# Patient Record
Sex: MALE | Race: ASIAN | NOT HISPANIC OR LATINO
[De-identification: names, ages, dates, MRNs, and addresses within clinical notes are randomized per-mention and may not be internally consistent; named-entity substitution may affect disease eponyms.]

---

## 2021-11-09 PROBLEM — Z00.00 ENCOUNTER FOR PREVENTIVE HEALTH EXAMINATION: Status: ACTIVE | Noted: 2021-11-09

## 2021-11-30 ENCOUNTER — APPOINTMENT (OUTPATIENT)
Dept: ORTHOPEDIC SURGERY | Facility: CLINIC | Age: 21
End: 2021-11-30

## 2021-12-06 ENCOUNTER — NON-APPOINTMENT (OUTPATIENT)
Age: 21
End: 2021-12-06

## 2021-12-06 ENCOUNTER — APPOINTMENT (OUTPATIENT)
Dept: ORTHOPEDIC SURGERY | Facility: CLINIC | Age: 21
End: 2021-12-06
Payer: COMMERCIAL

## 2021-12-06 VITALS
DIASTOLIC BLOOD PRESSURE: 98 MMHG | HEART RATE: 72 BPM | HEIGHT: 72 IN | SYSTOLIC BLOOD PRESSURE: 141 MMHG | WEIGHT: 190 LBS | BODY MASS INDEX: 25.73 KG/M2

## 2021-12-06 PROCEDURE — 73562 X-RAY EXAM OF KNEE 3: CPT | Mod: LT

## 2021-12-06 PROCEDURE — 99203 OFFICE O/P NEW LOW 30 MIN: CPT

## 2021-12-06 NOTE — HISTORY OF PRESENT ILLNESS
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 He was playing basketball jumped and landed awkwardly felt a pop in the knee. He was seen at [facility] were negative for fx. He was referred for MRI which showed ACL tear and was recommended sx. He is here for second opinion. The pain is intermittent brought on with prolonged sitting and standing. Takes Advil for pain. Reports buckling/instability. Denies locking, catching, numbness or tingling.  MRI done at . \par \par The patient's past medical history, past surgical history, medications and allergies were reviewed by me today with the patient and documented accordingly. In addition, the patient's family and social history, which were noncontributory to this visit, were reviewed also.

## 2021-12-06 NOTE — DISCUSSION/SUMMARY
[de-identified] : 22 y/o male with left knee ACL tear. \par \par Patient presents for evaluation of acute left knee pain. The patient's symptoms are consistent with anterior cruciate ligament pathology with positive gross instability testing. Patient reports mechanical symptoms of buckling as well as inability to return to full activity. MRI shows full-thickness midsubstance tear of the anterior cruciate ligament. Given the age and activity level of the patient, surgical intervention is warranted. \par \par All risks, benefits and alternatives to left anterior cruciate ligament reconstruction with evaluation of the meniscal and chondral surfaces were discussed in great detail with the patient. Risks include but are not limited to pain, bleeding, infection, stiffness/instability, impingement, graft re-rupture, meniscectomy versus repair, medical complications and risks of anesthesia. In addition, a discussion was had with the patient regarding anterior cruciate ligament graft options. This included the role of autograft versus allograft, and the associated donor site morbidity and rerupture rate with autograft versus risk of disease transmission/rerupture with allograft use. The patient expressed understanding and all questions were answered.  \par \par A discussion was also had with the patient regarding additional precautions during surgery given the recent Covid-19 pandemic; specifically with regards to perioperative care, visitor policy, and pre-admission testing. The patient understands that current protocol requires either documented Covid-19 vaccination or a negative Covid-19 test no more than 48hrs prior to surgery. \par \par Patient questions and concerns were answered. He has elected to proceed and will schedule accordingly.

## 2021-12-06 NOTE — PHYSICAL EXAM
[de-identified] : Oriented to time, place, person\par Mood: Normal\par Affect: Normal\par Appearance: Healthy, well appearing, no acute distress.\par Gait: Normal\par Assistive Devices: None\par \par Left Knee Exam:\par \par Skin: Clean, dry, intact\par Inspection: No obvious malalignment, no masses, no swelling, no effusion\par Pulses: 2+ DP/PT pulses \par ROM: 0-135 degrees of flexion. No pain with deep knee flexion/extension.\par Tenderness: No MJLT. No LJLT. No pain over the patella facets. No pain to the quadriceps tendon. No pain to the patella tendon. No posterior knee tenderness.\par Stability: Stable to varus, valgus. IIB Lachman testing. Negative anterior drawer, negative posterior drawer.\par Strength: 5/5 Q/H/TA/GS/EHL, without atrophy\par Neuro: Intact to light touch throughout, DTRs normal\par Additional Tests: Negative John's test, Negative patellar grind test  [de-identified] : The following radiographs were ordered and read by me during this patients visit. I reviewed each radiograph in detail with the patient and discussed the findings as highlighted below. \par \par 4 views of the left knee were obtained today, 12/06/2021, that show no acute fracture or dislocation. There is no medial, no lateral and no patellofemoral degenerative changes seen. There is no significant malalignment. No significant other obvious osseous abnormality, otherwise unremarkable. \par \par MRI left knee dated 10.20.2021 shows full-thickness proximal ACL with likely mild residual posterior lateral tibial plateau contusion and small effusion.

## 2021-12-06 NOTE — ADDENDUM
[FreeTextEntry1] : This note was written by Olena Fernando on 12/06/2021 acting solely as a scribe for Dr. Jaime Medley.\par \par All medical record entries made by the Scribe were at my, Dr. Jaime Medley, direction and personally dictated by me on 12/06/2021. I have personally reviewed the chart and agree that the record accurately reflects my personal performance of the history, physical exam, assessment and plan.

## 2022-01-07 ENCOUNTER — OUTPATIENT (OUTPATIENT)
Dept: OUTPATIENT SERVICES | Facility: HOSPITAL | Age: 22
LOS: 1 days | End: 2022-01-07
Payer: COMMERCIAL

## 2022-01-07 VITALS
WEIGHT: 184.09 LBS | HEART RATE: 98 BPM | RESPIRATION RATE: 16 BRPM | DIASTOLIC BLOOD PRESSURE: 94 MMHG | TEMPERATURE: 98 F | SYSTOLIC BLOOD PRESSURE: 140 MMHG | HEIGHT: 72 IN | OXYGEN SATURATION: 99 %

## 2022-01-07 DIAGNOSIS — S83.512A SPRAIN OF ANTERIOR CRUCIATE LIGAMENT OF LEFT KNEE, INITIAL ENCOUNTER: ICD-10-CM

## 2022-01-07 DIAGNOSIS — S83.519A SPRAIN OF ANTERIOR CRUCIATE LIGAMENT OF UNSPECIFIED KNEE, INITIAL ENCOUNTER: ICD-10-CM

## 2022-01-07 DIAGNOSIS — I10 ESSENTIAL (PRIMARY) HYPERTENSION: ICD-10-CM

## 2022-01-07 LAB
ANION GAP SERPL CALC-SCNC: 15 MMOL/L — HIGH (ref 7–14)
BUN SERPL-MCNC: 12 MG/DL — SIGNIFICANT CHANGE UP (ref 7–23)
CALCIUM SERPL-MCNC: 9.9 MG/DL — SIGNIFICANT CHANGE UP (ref 8.4–10.5)
CHLORIDE SERPL-SCNC: 102 MMOL/L — SIGNIFICANT CHANGE UP (ref 98–107)
CO2 SERPL-SCNC: 22 MMOL/L — SIGNIFICANT CHANGE UP (ref 22–31)
CREAT SERPL-MCNC: 0.55 MG/DL — SIGNIFICANT CHANGE UP (ref 0.5–1.3)
GLUCOSE SERPL-MCNC: 89 MG/DL — SIGNIFICANT CHANGE UP (ref 70–99)
HCT VFR BLD CALC: 43 % — SIGNIFICANT CHANGE UP (ref 39–50)
HGB BLD-MCNC: 14.8 G/DL — SIGNIFICANT CHANGE UP (ref 13–17)
MCHC RBC-ENTMCNC: 30.5 PG — SIGNIFICANT CHANGE UP (ref 27–34)
MCHC RBC-ENTMCNC: 34.4 GM/DL — SIGNIFICANT CHANGE UP (ref 32–36)
MCV RBC AUTO: 88.5 FL — SIGNIFICANT CHANGE UP (ref 80–100)
NRBC # BLD: 0 /100 WBCS — SIGNIFICANT CHANGE UP
NRBC # FLD: 0 K/UL — SIGNIFICANT CHANGE UP
PLATELET # BLD AUTO: 300 K/UL — SIGNIFICANT CHANGE UP (ref 150–400)
POTASSIUM SERPL-MCNC: 3.9 MMOL/L — SIGNIFICANT CHANGE UP (ref 3.5–5.3)
POTASSIUM SERPL-SCNC: 3.9 MMOL/L — SIGNIFICANT CHANGE UP (ref 3.5–5.3)
RBC # BLD: 4.86 M/UL — SIGNIFICANT CHANGE UP (ref 4.2–5.8)
RBC # FLD: 13.1 % — SIGNIFICANT CHANGE UP (ref 10.3–14.5)
SODIUM SERPL-SCNC: 139 MMOL/L — SIGNIFICANT CHANGE UP (ref 135–145)
WBC # BLD: 7.12 K/UL — SIGNIFICANT CHANGE UP (ref 3.8–10.5)
WBC # FLD AUTO: 7.12 K/UL — SIGNIFICANT CHANGE UP (ref 3.8–10.5)

## 2022-01-07 PROCEDURE — 93010 ELECTROCARDIOGRAM REPORT: CPT

## 2022-01-07 NOTE — H&P PST ADULT - PROBLEM SELECTOR PLAN 1
Left Knee Anterior Cruciate Ligament Reconstructions Bone Tendon Bone Autograft       Pre op instructions including Hibiclens with teach back reviewed with pt ; pt verbalized good understanding of pre op instructions    HTN; Pt to see MD for pre op medical evaluation ; pt to call 1/10/22 with name MD    Email sent to surgeon

## 2022-01-07 NOTE — H&P PST ADULT - PROBLEM SELECTOR PLAN 2
Pt reports HTN recently noted    Pt to see MD for pre op medical evaluation ; pt to call 1/10/22 with name of MD

## 2022-01-07 NOTE — H&P PST ADULT - NSICDXFAMILYHX_GEN_ALL_CORE_FT
FAMILY HISTORY:  Father  Still living? Yes, Estimated age: Age Unknown  FH: diabetes mellitus, Age at diagnosis: Age Unknown    Mother  Still living? Yes, Estimated age: Age Unknown  FH: diabetes mellitus, Age at diagnosis: Age Unknown  FHx: thyroid disease, Age at diagnosis: Age Unknown

## 2022-01-07 NOTE — H&P PST ADULT - NSICDXPASTMEDICALHX_GEN_ALL_CORE_FT
PAST MEDICAL HISTORY:  COVID 12/20/21     PAST MEDICAL HISTORY:  COVID 12/20/21    HTN (hypertension) Pt states recently noted ; pt denies rx

## 2022-01-07 NOTE — H&P PST ADULT - HISTORY OF PRESENT ILLNESS
Pt is a 21 y.o. male ; pt states 9/21 while playing basketball " landed wrong and felt a pop in my left knee " Pt reports left knee pain pt to Orthopedics 10/21 ; s/o MRI ; pt states " torn MRI " pt referred to surgeon ; pt now presents for Left Knee Anterior Cruciate Ligament reconstruction Bone Tendon Bone Autograft  Pt is a 21 y.o. male ; pt reports injury to left knee while playing basketball 9/21.  Pt reports left knee pain pt to Orthopedics 10/21 ; s/p MRI  " pt referred to surgeon ; pt now presents for Left Knee Anterior Cruciate Ligament Reconstruction Bone Tendon Bone Autograft

## 2022-01-24 ENCOUNTER — TRANSCRIPTION ENCOUNTER (OUTPATIENT)
Age: 22
End: 2022-01-24

## 2022-01-24 VITALS
WEIGHT: 184.09 LBS | OXYGEN SATURATION: 99 % | SYSTOLIC BLOOD PRESSURE: 134 MMHG | HEIGHT: 72 IN | RESPIRATION RATE: 16 BRPM | TEMPERATURE: 98 F | HEART RATE: 73 BPM | DIASTOLIC BLOOD PRESSURE: 87 MMHG

## 2022-01-24 NOTE — ASU PREOPERATIVE ASSESSMENT, ADULT ([facility] ONLY) - FALL HARM RISK - UNIVERSAL INTERVENTIONS
Bed in lowest position, wheels locked, appropriate side rails in place/Call bell, personal items and telephone in reach/Instruct patient to call for assistance before getting out of bed or chair/Non-slip footwear when patient is out of bed/[place] to call system/Physically safe environment - no spills, clutter or unnecessary equipment/Purposeful Proactive Rounding/Room/bathroom lighting operational, light cord in reach

## 2022-01-25 ENCOUNTER — OUTPATIENT (OUTPATIENT)
Dept: OUTPATIENT SERVICES | Facility: HOSPITAL | Age: 22
LOS: 1 days | Discharge: HOME | End: 2022-01-25
Payer: COMMERCIAL

## 2022-01-25 ENCOUNTER — APPOINTMENT (OUTPATIENT)
Dept: ORTHOPEDIC SURGERY | Facility: AMBULATORY SURGERY CENTER | Age: 22
End: 2022-01-25

## 2022-01-25 VITALS
HEART RATE: 87 BPM | TEMPERATURE: 99 F | OXYGEN SATURATION: 99 % | DIASTOLIC BLOOD PRESSURE: 99 MMHG | RESPIRATION RATE: 14 BRPM | SYSTOLIC BLOOD PRESSURE: 142 MMHG

## 2022-01-25 DIAGNOSIS — S83.512A SPRAIN OF ANTERIOR CRUCIATE LIGAMENT OF LEFT KNEE, INITIAL ENCOUNTER: ICD-10-CM

## 2022-01-25 PROCEDURE — 29888 ARTHRS AID ACL RPR/AGMNTJ: CPT | Mod: LT

## 2022-01-25 DEVICE — PK ACCESS CRUC RAP-PAC 1.5 STRL: Type: IMPLANTABLE DEVICE | Status: FUNCTIONAL

## 2022-01-25 DEVICE — SCREW CANUFLX SLK 1.5MM 7X20MM: Type: IMPLANTABLE DEVICE | Status: FUNCTIONAL

## 2022-01-25 DEVICE — SCREW FIX SOFT SILK 1.5  /  7MM X 25MM: Type: IMPLANTABLE DEVICE | Status: FUNCTIONAL

## 2022-01-25 DEVICE — FLEXIBLE PASSING PIN 13.50X2.4MM: Type: IMPLANTABLE DEVICE | Status: FUNCTIONAL

## 2022-01-25 RX ORDER — IBUPROFEN 200 MG
0 TABLET ORAL
Qty: 0 | Refills: 0 | DISCHARGE

## 2022-01-25 RX ORDER — ASPIRIN/CALCIUM CARB/MAGNESIUM 324 MG
1 TABLET ORAL
Qty: 0 | Refills: 0 | DISCHARGE

## 2022-01-25 RX ORDER — ACETAMINOPHEN 500 MG
0 TABLET ORAL
Qty: 0 | Refills: 0 | DISCHARGE

## 2022-01-25 NOTE — ASU DISCHARGE PLAN (ADULT/PEDIATRIC) - NS MD DC FALL RISK RISK
For information on Fall & Injury Prevention, visit: [email or web address] OR  [email or web address] OR  https://www.cdc.gov/steadi/patient.html

## 2022-01-25 NOTE — ASU DISCHARGE PLAN (ADULT/PEDIATRIC) - CARE PROVIDER_API CALL
Jaime Medley)  Orthopedics  [address]  [place] [address]  Phone: [phone]  Fax: [phone]  Follow Up Time: 2 weeks

## 2022-01-25 NOTE — ASU DISCHARGE PLAN (ADULT/PEDIATRIC) - NURSING INSTRUCTIONS
You were given IV Tylenol (1000mg) for pain management in the Operating Room.  Please do NOT take any additonal tylenol/acetaminophen products (Percocet, Vicodin, Excedrin) for the next 6-8 hours (after 1:45 pm)

## 2022-01-31 RX ORDER — OXYCODONE AND ACETAMINOPHEN 5; 325 MG/1; MG/1
5-325 TABLET ORAL
Qty: 20 | Refills: 0 | Status: ACTIVE | COMMUNITY
Start: 2022-01-24 | End: 1900-01-01

## 2022-02-09 ENCOUNTER — APPOINTMENT (OUTPATIENT)
Dept: ORTHOPEDIC SURGERY | Facility: CLINIC | Age: 22
End: 2022-02-09
Payer: COMMERCIAL

## 2022-02-09 PROBLEM — U07.1 COVID-19: Chronic | Status: ACTIVE | Noted: 2022-01-07

## 2022-02-09 PROBLEM — I10 ESSENTIAL (PRIMARY) HYPERTENSION: Chronic | Status: ACTIVE | Noted: 2022-01-07

## 2022-02-09 PROCEDURE — 99024 POSTOP FOLLOW-UP VISIT: CPT

## 2022-02-10 NOTE — HISTORY OF PRESENT ILLNESS
[3] : the patient reports pain that is 3/10 in severity [Clean/Dry/Intact] : clean, dry and intact [Healed] : healed [Neuro Intact] : an unremarkable neurological exam [Vascular Intact] : ~T peripheral vascular exam normal [Doing Well] : is doing well [Excellent Pain Control] : has excellent pain control [No Sign of Infection] : is showing no signs of infection [Sutures Removed] : sutures were removed [Steri-Strips Removed & Replaced] : steri-strips removed and replaced [Chills] : no chills [Fever] : no fever [Nausea] : no nausea [Vomiting] : no vomiting [Erythema] : not erythematous [Discharge] : absent of discharge [Swelling] : not swollen [Dehiscence] : not dehisced [de-identified] : 20 y/o male s/p left knee ACL (BTB auto) 1/25/22 [de-identified] : 22 y/o male s/p left knee ACL (BTB auto). He is doing well. Presents in [place] brace and ambulating via crutches. He is not taking pain medication. Denies post op complications.  [de-identified] : Left Knee Exam:\par \par Skin: Incision(s) Clean, dry, intact, no drainage, healed\par Inspection: Residual swelling, moderate residual effusion, ecchymosis\par Pulses: 2+ DP/PT pulses \par ROM: Not tested [left/right]\par Tenderness: Tender throughout anterior knee joint.\par Stability: Stable\par Strength: Intact Q/H/TA/GS/EHL\par Neuro: Intact to light touch throughout  [de-identified] : 20 y/o male s/p left knee ACL (BTB auto). \par \par All intraoperative imaging was discussed in detail with the patient. All questions were answered. \par \par Recommendations: \par 1. PT evaluation and treatment; including AAROM/AROM, therapeutic exercise (include hamstring and quadriceps strengthening), heel slides, nonweightbearing stretch of the gastrocsoleus complex and straight leg raises to prevent extension lag. Progression to closed chain exercises/balance exercise/bike in 2-4 weeks.\par 2. Brace: Unlock brace for ambulation as tolerated. Remove the brace for sleeping as tolerated. May discontinue brace once full extension and without extensor lag has been achieved (approx 4-6wks post-op)\par 3. Meds: Continue [id number] daily, pain medication prn, may transition to NSAIDS.\par 4. Ice/elevate as needed and\par 5. Restrictions: None \par \par Followup in 4 weeks for imaging and clinical evaluation of stability.

## 2022-02-10 NOTE — ADDENDUM
[FreeTextEntry1] : This note was written by Olena Fernando on 02/09/2022 acting solely as a scribe for Dr. Jaime Medley.\par \par All medical record entries made by the Scribe were at my, Dr. Jaime Medley, direction and personally dictated by me on 02/09/2022. I have personally reviewed the chart and agree that the record accurately reflects my personal performance of the history, physical exam, assessment and plan.

## 2022-03-09 ENCOUNTER — APPOINTMENT (OUTPATIENT)
Dept: ORTHOPEDIC SURGERY | Facility: CLINIC | Age: 22
End: 2022-03-09
Payer: COMMERCIAL

## 2022-03-09 PROCEDURE — 73562 X-RAY EXAM OF KNEE 3: CPT | Mod: LT

## 2022-03-09 PROCEDURE — 99024 POSTOP FOLLOW-UP VISIT: CPT

## 2022-03-09 NOTE — ADDENDUM
[FreeTextEntry1] : This note was written by Olena Fernando on 03/09/2022 acting solely as a scribe for Dr. Jaime Medley.\par \par All medical record entries made by the Scribe were at my, Dr. Jaime Medley, direction and personally dictated by me on 03/09/2022. I have personally reviewed the chart and agree that the record accurately reflects my personal performance of the history, physical exam, assessment and plan.

## 2022-03-30 NOTE — HISTORY OF PRESENT ILLNESS
[0] : no pain reported [Clean/Dry/Intact] : clean, dry and intact [Healed] : healed - - - [Neuro Intact] : an unremarkable neurological exam [Vascular Intact] : ~T peripheral vascular exam normal [Excellent Pain Control] : has excellent pain control [No Sign of Infection] : is showing no signs of infection [Chills] : no chills [Fever] : no fever [Nausea] : no nausea [Vomiting] : no vomiting [Erythema] : not erythematous [Discharge] : absent of discharge [Swelling] : not swollen [Dehiscence] : not dehisced [Slow Progress] : is progressing slowly [de-identified] : 22 y/o male s/p left knee ACL (BTB auto) 1/25/22 [de-identified] : 22 y/o male s/p left knee ACL (BTB auto). He is doing well. Presents in [place] brace. He is attending PT 3 x per week. Working on ROM. He is not taking pain medication. Denies post op complications.  [de-identified] : Left Knee Exam:\par \par Skin: Incision(s) Clean, dry, intact, no drainage, healed\par Inspection: Residual swelling\par Pulses: 2+ DP/PT pulses \par ROM: 5-80 flexion\par Tenderness: mild tender throughout anterior knee joint.\par Stability: Stable, IA Lachman test\par Strength: Intact Q/H/TA/GS/EHL, atrophy of quad. \par Neuro: Intact to light touch throughout  [de-identified] : The following radiographs were ordered and read by me during this patients visit. I reviewed each radiograph in detail with the patient and discussed the findings as highlighted below.\par \par  2 views of the left knee were obtained today, 03/09/2022, that show interval change of an anatomic anterior cruciate ligament reconstruction. No evidence of hardware failure, otherwise unremarkable.  [de-identified] : 20 y/o male s/p left knee ACL (BTB auto). \par \par Postoperative imaging shows anatomic ACL reconstruction. Patient has good clinical stability on examination, and is doing well with postoperative rehabilitation at this time. \par \par Recommendations: \par 1. Continue PT per protocol; WBAT, closed chain exercises, proprioception exercise, begin elliptical when tolerated, hamstring stretching/strengthening\par 2. Brace: May discontinue brace once full extension and without extensor lag has been achieved\par 3. Meds: Discontinue  mg daily, OTC pain/Nsaids medication prn\par 4. Continue symptomatic Ice/elevate as needed\par 5. Restrictions: as discussed. \par \par Followup in 6 weeks.

## 2022-04-21 ENCOUNTER — APPOINTMENT (OUTPATIENT)
Dept: ORTHOPEDIC SURGERY | Facility: CLINIC | Age: 22
End: 2022-04-21
Payer: COMMERCIAL

## 2022-04-21 PROCEDURE — 99024 POSTOP FOLLOW-UP VISIT: CPT

## 2022-04-22 NOTE — ADDENDUM
[FreeTextEntry1] : This note was written by Olena Fernando on 04/21/2022 acting solely as a scribe for Dr. Jaime Medley.\par \par All medical record entries made by the Scribe were at my, Dr. Jaime Medley, direction and personally dictated by me on 04/21/2022. I have personally reviewed the chart and agree that the record accurately reflects my personal performance of the history, physical exam, assessment and plan.

## 2022-04-22 NOTE — HISTORY OF PRESENT ILLNESS
[0] : no pain reported [Clean/Dry/Intact] : clean, dry and intact [Healed] : healed [Neuro Intact] : an unremarkable neurological exam [Vascular Intact] : ~T peripheral vascular exam normal [Excellent Pain Control] : has excellent pain control [No Sign of Infection] : is showing no signs of infection [Doing Well] : is doing well [Chills] : no chills [Fever] : no fever [Nausea] : no nausea [Vomiting] : no vomiting [Erythema] : not erythematous [Discharge] : absent of discharge [Swelling] : not swollen [Dehiscence] : not dehisced [de-identified] : 22 y/o male s/p left knee ACL (BTB auto) 1/25/22 [de-identified] : 22 y/o male s/p left knee ACL (BTB auto). He is doing well. Is participating with PT twice well. ROM has improved. No longer taking medications for the pain. Denies post-op complications.  [de-identified] : Left Knee Exam:\par \par Skin: Incision(s) Clean, dry, intact, no drainage, healed\par Inspection: Residual swelling\par Pulses: 2+ DP/PT pulses \par ROM: 0-140 flexion\par Tenderness: minimal tender throughout anterior knee joint.\par Stability: Stable, IA Lachman test\par Strength: Intact Q/H/TA/GS/EHL, atrophy of quad. \par Neuro: Intact to light touch throughout  [de-identified] : 22 y/o male s/p left knee ACL (BTB auto). \par \par Patient doing extremely well at this time with minimal complaints of pain and dysfunction.  Patient is looking to progress with physical therapy.\par \par Recommendations: \par \par 1. Continue PT per protocol; Progress to terminal ROM as tolerated. Continue hamstring/quad strengthening, advance closed chain exercises, proprioception exercise. Begin running/impact loading program. Progression of sport specific endurance/strengthening/sport specific drills. Goal of return to sport at 9-12mos following confirmation of strength, confidence and agility with completion of an RTP program.\par 2. Brace: OTC knee sleeve as needed\par 3. Meds: PRN use NSAIDs/Tylenol\par 4. Continue symptomatic Ice/elevate as needed\par 5. Restrictions: None \par \par Followup in 3 mos.

## 2022-07-21 ENCOUNTER — APPOINTMENT (OUTPATIENT)
Dept: ORTHOPEDIC SURGERY | Facility: CLINIC | Age: 22
End: 2022-07-21

## 2022-07-21 PROCEDURE — 99213 OFFICE O/P EST LOW 20 MIN: CPT

## 2022-08-01 NOTE — PHYSICAL EXAM
de-identified] : Oriented to time, place, person\par Mood: Normal\par Affect: Normal\par Appearance: Healthy, well appearing, no acute distress.\par Gait: Normal\par Assistive Devices: None \par \par Left Knee Exam:\par \par Skin: Incision(s) Clean, dry, intact, no drainage, healed\par Inspection: Residual swelling\par Pulses: 2+ DP/PT pulses \par ROM: 0-140 flexion\par Tenderness: minimal tender throughout anterior knee joint.\par Stability: Stable, IA Lachman test\par Strength: Intact Q/H/TA/GS/EHL, atrophy of quad. \par Neuro: Intact to light touch throughout.

## 2022-08-01 NOTE — ADDENDUM
[FreeTextEntry1] : This note was written by Olena Fernando on 07/21/2022 acting solely as a scribe for Dr. Jaime Medley.\par \par All medical record entries made by the Scribe were at my, Dr. Jaime Medley, direction and personally dictated by me on 07/21/2022. I have personally reviewed the chart and agree that the record accurately reflects my personal performance of the history, physical exam, assessment and plan.

## 2022-08-01 NOTE — DISCUSSION/SUMMARY
[de-identified] : 21 y/o male s/p left knee ACL (BTB auto). \par \par Patient doing extremely well at this time with minimal complaints of pain and dysfunction. Patient is looking to progress with physical therapy.\par \par Discussion was centered today on goals of activity and return to pivoting/cutting exercise. Outlined subjectivity on return to play and sport specific exercise, and possible utilization of objective return to play criteria through a physical therapy guided RTP protocol. Also discussed the utility of bracing in the postoperative period for return to sport. It is not typically recommended given stability of construct, but patient may continue to experience anxiety/loss of confidence over the ensuing months. \par \par Recommendations:\par 1. Continued flexibility training, endurance training and strengthening/conditioning to the quadriceps/hamstring/core to maintain stability. Full return to sport at 9-12months\par 2. Completion of an RTP program when able\par 3. Utilize ice/rest as needed\par 4. Bracing as discussed \par \par Follow-up:\par 3-6 months.

## 2022-08-01 NOTE — HISTORY OF PRESENT ILLNESS
[de-identified] : 22 year old male who presents s/p left knee ACL (BTB auto) on 1/25/22.   Overall, he is doing well.  He is doing physical therapy.  He started running again and has had swelling so he and the therapist decided it was good for him to take a break from strengthening and conditioning but he has restarted it this week again.  Denies any pain.  \par

## 2022-11-22 NOTE — H&P PST ADULT - NS SC CAGE ALCOHOL GUILTY ABOUT
room air no Cibinqo Counseling: I discussed with the patient the risks of Cibinqo therapy including but not limited to common cold, nausea, headache, cold sores, increased blood CPK levels, dizziness, UTIs, fatigue, acne, and vomitting. Live vaccines should be avoided.  This medication has been linked to serious infections; higher rate of mortality; malignancy and lymphoproliferative disorders; major adverse cardiovascular events; thrombosis; thrombocytopenia and lymphopenia; lipid elevations; and retinal detachment.

## 2023-01-09 ENCOUNTER — APPOINTMENT (OUTPATIENT)
Dept: ORTHOPEDIC SURGERY | Facility: CLINIC | Age: 23
End: 2023-01-09
Payer: COMMERCIAL

## 2023-01-09 VITALS
DIASTOLIC BLOOD PRESSURE: 86 MMHG | WEIGHT: 185 LBS | TEMPERATURE: 97.6 F | SYSTOLIC BLOOD PRESSURE: 133 MMHG | BODY MASS INDEX: 25.06 KG/M2 | OXYGEN SATURATION: 99 % | HEIGHT: 72 IN | HEART RATE: 72 BPM

## 2023-01-09 PROCEDURE — 73562 X-RAY EXAM OF KNEE 3: CPT | Mod: LT

## 2023-01-09 PROCEDURE — 99213 OFFICE O/P EST LOW 20 MIN: CPT

## 2023-01-17 NOTE — HISTORY OF PRESENT ILLNESS
[de-identified] : 22 year old male who presents s/p left knee ACL (BTB auto) on 1/25/22.  He completed PT 1 week ago. Reports slight weakness in the leg and pressure sensation with running.  Overall, he is doing well. Denies any pain.  \par

## 2023-01-17 NOTE — PHYSICAL EXAM
[de-identified] : Oriented to time, place, person\par Mood: Normal\par Affect: Normal\par Appearance: Healthy, well appearing, no acute distress.\par Gait: Normal\par Assistive Devices: None \par \par Left Knee Exam:\par \par Skin: Incision(s) Clean, dry, intact, no drainage, healed\par Inspection: No significant swelling\par Pulses: 2+ DP/PT pulses \par ROM: 0-140 flexion\par Tenderness: None\par Stability: Stable, IA Lachman test\par Strength: Intact Q/H/TA/GS/EHL, mild atrophy of quad. \par Neuro: Intact to light touch throughout.  [de-identified] : \par The following radiographs were ordered and read by me during this patients visit. I reviewed each radiograph in detail with the patient and discussed the findings as highlighted below. \par \par 3 views of the left were obtained today that show anatomic anterior cruciate ligament reconstruction with bone tendon bone autograft. No evidence of hardware failure, otherwise unremarkable. .

## 2023-01-17 NOTE — DISCUSSION/SUMMARY
[de-identified] : 21 y/o male s/p left knee ACL (BTB auto). \par \par Patient doing extremely well at this time. Outlined subjectivity on return to play and sport specific exercise, and possible utilization of objective return to play criteria through a physical therapy guided RTP protocol. \par \par Recommendations:\par 1. Continued PT; RTP criteria for gradual RTP as able.\par 2. Utilize ice/rest as needed\par 3. Bracing as discussed \par \par Follow-up 6mos

## 2023-04-24 ENCOUNTER — APPOINTMENT (OUTPATIENT)
Dept: ORTHOPEDIC SURGERY | Facility: CLINIC | Age: 23
End: 2023-04-24
Payer: COMMERCIAL

## 2023-04-24 VITALS
DIASTOLIC BLOOD PRESSURE: 82 MMHG | WEIGHT: 185 LBS | SYSTOLIC BLOOD PRESSURE: 130 MMHG | BODY MASS INDEX: 25.06 KG/M2 | HEIGHT: 72 IN

## 2023-04-24 DIAGNOSIS — S83.512D SPRAIN OF ANTERIOR CRUCIATE LIGAMENT OF LEFT KNEE, SUBSEQUENT ENCOUNTER: ICD-10-CM

## 2023-04-24 PROCEDURE — 99213 OFFICE O/P EST LOW 20 MIN: CPT

## 2023-04-24 NOTE — ADDENDUM
[FreeTextEntry1] : This note was written by Olena Fernando on 04/24/2023 acting solely as a scribe for Dr. Jaime Medley.\par \par All medical record entries made by the Scribe were at my, Dr. Jaime Medley, direction and personally dictated by me on 04/24/2023. I have personally reviewed the chart and agree that the record accurately reflects my personal performance of the history, physical exam, assessment and plan.

## 2023-04-24 NOTE — HISTORY OF PRESENT ILLNESS
[de-identified] : 23 year old male who presents s/p left knee ACL (BTB auto) on 1/25/22.  He has been doing HEP. Overall, he is doing well. Denies any pain. Has been returning to basketball activity (half-court). Did not undergo PT RTP testing.

## 2023-04-24 NOTE — DISCUSSION/SUMMARY
[de-identified] : 24 y/o male s/p left knee ACL (BTB auto). \par \par Patient has had an uncomplicated postoperative course following left anterior cruciate ligament reconstruction. Currently the patient reports good subjective stability and functional outcome and has completed a rigorous physical therapy protocol. He has now returned to sports activity with Basketball (limited). We had previously discussed utilization of objective return to play criteria through a physical therapy guided RTP protocol, but at this time is eager to RTP. Discussed continued strengthening and conditioning over next 6mos with gradual RTP as discussed. \par \par Recommendations:\par 1. Continued strengthening/conditioning. Continue HEP. Consider PT RTP testing. \par 2. Limited RTP; with progressions as discussed.\par 3. Utilize ice/rest/NSAIDS as needed\par \par Follow-up as needed.

## 2023-04-24 NOTE — PHYSICAL EXAM
[de-identified] : Oriented to time, place, person\par Mood: Normal\par Affect: Normal\par Appearance: Healthy, well appearing, no acute distress.\par Gait: Normal\par Assistive Devices: None \par \par Left Knee Exam:\par \par Skin: Incision(s) Clean, dry, intact, no drainage, healed\par Inspection: No swelling\par Pulses: 2+ DP/PT pulses \par ROM: 0-140 flexion\par Tenderness: None\par Stability: IA Lachman test\par Strength: 5/5 Q/H/TA/GS/EHL, mild atrophy of quad. \par Neuro: Intact to light touch throughout.

## 2025-02-19 NOTE — BRIEF OPERATIVE NOTE - OPERATION/FINDINGS
[phone]. Called [facility] and provided her contact as she saw the pt today 2/19/25. Waiting for call back to discuss if only one leg was done and if so why. Awaiting call back.

## 2025-04-18 NOTE — ASU PREOP CHECKLIST - TYPE OF SOLUTION
Spoke with patient and relayed test results. Patient voiced understanding.       Please move patient's appointment up within a week or two, patient has seen Joanie Vincent in the past and is fine with seeing her again.   lr

## 2025-08-05 ENCOUNTER — EMERGENCY (EMERGENCY)
Facility: HOSPITAL | Age: 25
LOS: 1 days | End: 2025-08-05
Attending: STUDENT IN AN ORGANIZED HEALTH CARE EDUCATION/TRAINING PROGRAM | Admitting: STUDENT IN AN ORGANIZED HEALTH CARE EDUCATION/TRAINING PROGRAM
Payer: COMMERCIAL

## 2025-08-05 VITALS
TEMPERATURE: 98 F | SYSTOLIC BLOOD PRESSURE: 148 MMHG | HEIGHT: 72 IN | OXYGEN SATURATION: 100 % | DIASTOLIC BLOOD PRESSURE: 88 MMHG | RESPIRATION RATE: 16 BRPM | WEIGHT: 195.11 LBS | HEART RATE: 75 BPM

## 2025-08-05 PROCEDURE — 99284 EMERGENCY DEPT VISIT MOD MDM: CPT | Mod: 25

## 2025-08-05 PROCEDURE — 29515 APPLICATION SHORT LEG SPLINT: CPT | Mod: RT

## 2025-08-05 PROCEDURE — 73610 X-RAY EXAM OF ANKLE: CPT

## 2025-08-05 PROCEDURE — 73610 X-RAY EXAM OF ANKLE: CPT | Mod: 26,RT

## 2025-08-05 PROCEDURE — 99283 EMERGENCY DEPT VISIT LOW MDM: CPT

## 2025-08-06 ENCOUNTER — APPOINTMENT (OUTPATIENT)
Dept: ORTHOPEDIC SURGERY | Facility: CLINIC | Age: 25
End: 2025-08-06
Payer: COMMERCIAL

## 2025-08-06 VITALS — WEIGHT: 195 LBS | BODY MASS INDEX: 26.41 KG/M2 | HEIGHT: 72 IN

## 2025-08-06 DIAGNOSIS — S93.491A SPRAIN OF OTHER LIGAMENT OF RIGHT ANKLE, INITIAL ENCOUNTER: ICD-10-CM

## 2025-08-06 PROCEDURE — 99204 OFFICE O/P NEW MOD 45 MIN: CPT

## 2025-08-07 ENCOUNTER — APPOINTMENT (OUTPATIENT)
Dept: ORTHOPEDIC SURGERY | Facility: CLINIC | Age: 25
End: 2025-08-07

## (undated) DEVICE — TOURNIQUET CUFF 24" DUAL PORT SINGLE BLADDER LUER LOCK  (BLACK)

## (undated) DEVICE — WARMING BLANKET UPPER ADULT

## (undated) DEVICE — NDL HYPO SAFE 21G X 1.5" (GREEN)

## (undated) DEVICE — TUBING DEPUY MITEK FMS OUTFLOW

## (undated) DEVICE — TOURNIQUET ESMARK 6"

## (undated) DEVICE — DEPUY ACL GRAFT KNIFE 10MM

## (undated) DEVICE — PACK MINOR NO DRAPE

## (undated) DEVICE — TOURNIQUET CUFF 34" DUAL PORT W PLC

## (undated) DEVICE — SUT VICRYL 2-0 27" CT-2 UNDYED

## (undated) DEVICE — DEPUY ACL GRAFT KNIFE 9MM

## (undated) DEVICE — VENODYNE/SCD SLEEVE CALF MEDIUM

## (undated) DEVICE — SAW BLADE MICROAIRE SAGITTAL 9.4MMX25.4MMX0.6MM

## (undated) DEVICE — GLV 8 PROTEXIS (CREAM) NEU-THERA

## (undated) DEVICE — SHAVER BLADE S&N FULL RADIUS 3.5MM STRAIGHT (BEIGE)

## (undated) DEVICE — PACK KNEE ARTHROSCOPY

## (undated) DEVICE — PLUG BONE CANN 7-12MM

## (undated) DEVICE — SUT NYLON 3-0 18" PS-2

## (undated) DEVICE — ARTHREX KIT ACL TRANSTIBIAL WITHOUT SAW BLADE

## (undated) DEVICE — TUBING DEPUY MITEK FMS INFLOW

## (undated) DEVICE — SOL IRR BAG NS 0.9% 3000ML

## (undated) DEVICE — BLADE SURGICAL #15 CARBON

## (undated) DEVICE — NDL SPINAL 18G X 3.5" (PINK)

## (undated) DEVICE — SUT ETHIBOND 2 30" V37

## (undated) DEVICE — DRSG STERISTRIPS 0.25 X 3"

## (undated) DEVICE — CAM-ESU 1501230: Type: DURABLE MEDICAL EQUIPMENT

## (undated) DEVICE — SUT VICRYL 0 18" CT-1 (POP-OFF)

## (undated) DEVICE — CANNULA S&N CLEAR-TRAC SCREW 7MM

## (undated) DEVICE — POSITIONER STRAP ARMBOARD VELCRO TS-30

## (undated) DEVICE — SHAVER BLADE S&N FULL RADIUS BONECUTTER 5.5MM (ORANGE)

## (undated) DEVICE — SUT FIBERWIRE LOOP 2 STRAIGHT NDL 15"

## (undated) DEVICE — SUT MONOCRYL 4-0 18" PS-2

## (undated) DEVICE — SUT VICRYL 3-0 18" SH (POP-OFF)

## (undated) DEVICE — DRAPE 3/4 SHEET 52X76"